# Patient Record
Sex: MALE | Race: WHITE | URBAN - METROPOLITAN AREA
[De-identification: names, ages, dates, MRNs, and addresses within clinical notes are randomized per-mention and may not be internally consistent; named-entity substitution may affect disease eponyms.]

---

## 2017-07-10 DIAGNOSIS — M25.561 RIGHT KNEE PAIN, UNSPECIFIED CHRONICITY: Primary | ICD-10-CM

## 2017-07-11 ENCOUNTER — OFFICE VISIT (OUTPATIENT)
Dept: ORTHOPEDICS | Facility: CLINIC | Age: 27
End: 2017-07-11

## 2017-07-11 VITALS
DIASTOLIC BLOOD PRESSURE: 77 MMHG | WEIGHT: 161 LBS | SYSTOLIC BLOOD PRESSURE: 121 MMHG | BODY MASS INDEX: 22.54 KG/M2 | HEIGHT: 71 IN

## 2017-07-11 DIAGNOSIS — S86.111A GASTROCNEMIUS STRAIN, RIGHT, INITIAL ENCOUNTER: Primary | ICD-10-CM

## 2017-07-11 NOTE — LETTER
Date:July 12, 2017      Patient was self referred, no letter generated. Do not send.        [facility] Health Information

## 2017-07-11 NOTE — LETTER
"  7/11/2017      RE: Lui Mejia  [address]  [facility] [place] [address]        Subjective:   Lui Mejia is a 27 year old male who is here for right knee pain after playing soccer. Hx of left ACL tear.     He was backpeddling during soccer 2 weeks ago and had a hyperextension moment and since that time has felt some instability in the right knee. No swelling, no locking. Feels tightness in the posterior knee.     Background:   Date of injury: 6/26/17     PAST MEDICAL, SOCIAL, SURGICAL AND FAMILY HISTORY: He  has a past medical history of NO ACTIVE PROBLEMS.  He  has a past surgical history that includes acl repair, left.  His family history is not on file.  He reports that he has never smoked. He does not have any smokeless tobacco history on file. He reports that he drinks about 3.6 oz of alcohol per week  He reports that he does not use illicit drugs.    ALLERGIES: He has No Known Allergies.    CURRENT MEDICATIONS: He currently has no medications in their medication list.     REVIEW OF SYSTEMS: 10 point review of systems is negative except as noted above.     Exam:   /77  Ht 5' 11\" (1.803 m)  Wt 161 lb (73 kg)  BMI 22.45 kg/m2   CONSTITUTIONAL: healthy, alert and no distress  GAIT: normal  NEUROLOGIC: Non-focal  PSYCHIATRIC: affect normal/bright and mentation appears normal.    MUSCULOSKELETAL:   RIGHT KNEE: Comprehensive knee examination demonstrates FROM, (-) effusion, (-) medial/lateral patellar facet tenderness, (-) patellar inhibition, (-) apprehension; (-) pain or laxity with valgus stress testing in 0 or 30 degrees of knee flexion, (-) pain or laxity with varus stress testing in 0 or 30 degrees of knee flexion, (-) lachman, (-) pivot shift, (-) PD; (-) medial joint line tenderness, (-) lateral joint line tenderness, (-) bounce test, (-) Jimi test. Nontender over the fibular head, or biceps femoris tendon. Negative Truong's test. TTP over the origin of the lateral head of the " gastrocnemius and aggravated by concomitant ankle dorsiflexion.     RIGHT ANKLE: achilles nontender and intact.     Assessment/Plan:   (E53.378X) Gastrocnemius strain, right, initial encounter  (primary encounter diagnosis)  Comment: Discussed. May start stretching. Currently 80% improved and suspect he should resolve and may retest the right leg after 2-3 days of painfree ambulation. If unable to transition back to activity in 2-3 weeks then will call and will place order for formal PT at that time. All questions answered.          Hair Allan MD

## 2017-07-11 NOTE — PROGRESS NOTES
" Subjective:   Lui Mejia is a 27 year old male who is here for right knee pain after playing soccer. Hx of left ACL tear.     He was backpeddling during soccer 2 weeks ago and had a hyperextension moment and since that time has felt some instability in the right knee. No swelling, no locking. Feels tightness in the posterior knee.     Background:   Date of injury: 6/26/17     PAST MEDICAL, SOCIAL, SURGICAL AND FAMILY HISTORY: He  has a past medical history of NO ACTIVE PROBLEMS.  He  has a past surgical history that includes acl repair, left.  His family history is not on file.  He reports that he has never smoked. He does not have any smokeless tobacco history on file. He reports that he drinks about 3.6 oz of alcohol per week  He reports that he does not use illicit drugs.    ALLERGIES: He has No Known Allergies.    CURRENT MEDICATIONS: He currently has no medications in their medication list.     REVIEW OF SYSTEMS: 10 point review of systems is negative except as noted above.     Exam:   /77  Ht 5' 11\" (1.803 m)  Wt 161 lb (73 kg)  BMI 22.45 kg/m2   CONSTITUTIONAL: healthy, alert and no distress  GAIT: normal  NEUROLOGIC: Non-focal  PSYCHIATRIC: affect normal/bright and mentation appears normal.    MUSCULOSKELETAL:   RIGHT KNEE: Comprehensive knee examination demonstrates FROM, (-) effusion, (-) medial/lateral patellar facet tenderness, (-) patellar inhibition, (-) apprehension; (-) pain or laxity with valgus stress testing in 0 or 30 degrees of knee flexion, (-) pain or laxity with varus stress testing in 0 or 30 degrees of knee flexion, (-) lachman, (-) pivot shift, (-) PD; (-) medial joint line tenderness, (-) lateral joint line tenderness, (-) bounce test, (-) Jimi test. Nontender over the fibular head, or biceps femoris tendon. Negative Truong's test. TTP over the origin of the lateral head of the gastrocnemius and aggravated by concomitant ankle dorsiflexion.     RIGHT ANKLE: achilles " nontender and intact.     Assessment/Plan:   (X28.261Z) Gastrocnemius strain, right, initial encounter  (primary encounter diagnosis)  Comment: Discussed. May start stretching. Currently 80% improved and suspect he should resolve and may retest the right leg after 2-3 days of painfree ambulation. If unable to transition back to activity in 2-3 weeks then will call and will place order for formal PT at that time. All questions answered.

## 2017-07-11 NOTE — MR AVS SNAPSHOT
"              After Visit Summary   2017    Lui Mejia    MRN: [id number]           Patient Information     Date Of Birth          1990        Visit Information        Provider Department      2017 10:00 AM Hair Allan MD [facility]        Today's Diagnoses     Gastrocnemius strain, right, initial encounter    -  1       Follow-ups after your visit        Who to contact     Please call your clinic at [phone] to:    Ask questions about your health    Make or cancel appointments    Discuss your medicines    Learn about your test results    Speak to your doctor   If you have compliments or concerns about an experience at your clinic, or if you wish to file a complaint, please contact [facility] Patient Relations at [phone] or email us at [email or web address]         Additional Information About Your Visit        MyChart Information     [employer] is an electronic gateway that provides easy, online access to your medical records. With [employer], you can request a clinic appointment, read your test results, renew a prescription or communicate with your care team.     To sign up for [employer] visit the website at [email or web address]   You will be asked to enter the access code listed below, as well as some personal information. Please follow the directions to create your username and password.     Your access code is: [email or web address]  Expires: 10/4/2017  6:30 AM     Your access code will  in 90 days. If you need help or a new code, please contact your [facility] or call [phone] for assistance.        Care EveryWhere ID     This is your Care EveryWhere ID. This could be used by other organizations to access your [place] medical records  [id number]        Your Vitals Were     Height BMI (Body Mass Index)                5' 11\" (1.803 m) 22.45 kg/m2           Blood Pressure from Last 3 " Encounters:   07/11/17 121/77    Weight from Last 3 Encounters:   07/11/17 161 lb (73 kg)              Today, you had the following     No orders found for display       Primary Care Provider    None       No address on file        Equal Access to Services     CHRISTOPHER FERNANDONICKY : Angie aad ku hadleonard Blair, wamacda luqadaha, gloriata kaalmada alexis, irene vieyra tyrelariana post laKrystaldiana rowe. So [facility] [phone].    ATENCIÓN: Si habla español, tiene a ogdwin disposición servicios gratuitos de asistencia lingüística. Llame al [phone].    We comply with applicable federal civil rights laws and [place] laws. We do not discriminate on the basis of race, color, national origin, age, disability sex, sexual orientation or gender identity.            Thank you!     Thank you for choosing [facility]  for your care. Our goal is always to provide you with excellent care. Hearing back from our patients is one way we can continue to improve our services. Please take a few minutes to complete the written survey that you may receive in the mail after your visit with us. Thank you!             Your Updated Medication List - Protect others around you: Learn how to safely use, store and throw away your medicines at www.disposemymeds.org.      Notice  As of 7/11/2017 10:30 AM    You have not been prescribed any medications.